# Patient Record
Sex: FEMALE | Race: WHITE | URBAN - METROPOLITAN AREA
[De-identification: names, ages, dates, MRNs, and addresses within clinical notes are randomized per-mention and may not be internally consistent; named-entity substitution may affect disease eponyms.]

---

## 2017-11-13 ENCOUNTER — APPOINTMENT (RX ONLY)
Dept: URBAN - METROPOLITAN AREA CLINIC 142 | Facility: CLINIC | Age: 77
Setting detail: DERMATOLOGY
End: 2017-11-13

## 2017-11-13 DIAGNOSIS — I83.9 ASYMPTOMATIC VARICOSE VEINS OF LOWER EXTREMITIES: ICD-10-CM

## 2017-11-13 DIAGNOSIS — L91.8 OTHER HYPERTROPHIC DISORDERS OF THE SKIN: ICD-10-CM

## 2017-11-13 DIAGNOSIS — Z71.89 OTHER SPECIFIED COUNSELING: ICD-10-CM

## 2017-11-13 DIAGNOSIS — D18.0 HEMANGIOMA: ICD-10-CM

## 2017-11-13 DIAGNOSIS — L82.1 OTHER SEBORRHEIC KERATOSIS: ICD-10-CM

## 2017-11-13 DIAGNOSIS — D22 MELANOCYTIC NEVI: ICD-10-CM

## 2017-11-13 PROBLEM — D22.5 MELANOCYTIC NEVI OF TRUNK: Status: ACTIVE | Noted: 2017-11-13

## 2017-11-13 PROBLEM — D18.01 HEMANGIOMA OF SKIN AND SUBCUTANEOUS TISSUE: Status: ACTIVE | Noted: 2017-11-13

## 2017-11-13 PROBLEM — I10 ESSENTIAL (PRIMARY) HYPERTENSION: Status: ACTIVE | Noted: 2017-11-13

## 2017-11-13 PROBLEM — I83.93 ASYMPTOMATIC VARICOSE VEINS OF BILATERAL LOWER EXTREMITIES: Status: ACTIVE | Noted: 2017-11-13

## 2017-11-13 PROBLEM — L57.0 ACTINIC KERATOSIS: Status: ACTIVE | Noted: 2017-11-13

## 2017-11-13 PROCEDURE — ? COUNSELING

## 2017-11-13 PROCEDURE — ? SKIN TAG REMOVAL MULTI (COSMETIC)

## 2017-11-13 PROCEDURE — 99203 OFFICE O/P NEW LOW 30 MIN: CPT

## 2017-11-13 ASSESSMENT — LOCATION ZONE DERM
LOCATION ZONE: TRUNK
LOCATION ZONE: EAR
LOCATION ZONE: LEG
LOCATION ZONE: NECK
LOCATION ZONE: EYELID

## 2017-11-13 ASSESSMENT — LOCATION SIMPLE DESCRIPTION DERM
LOCATION SIMPLE: RIGHT EAR
LOCATION SIMPLE: LEFT SUPERIOR EYELID
LOCATION SIMPLE: LEFT PRETIBIAL REGION
LOCATION SIMPLE: RIGHT PRETIBIAL REGION
LOCATION SIMPLE: LEFT LOWER BACK
LOCATION SIMPLE: LEFT ANTERIOR NECK
LOCATION SIMPLE: ABDOMEN

## 2017-11-13 ASSESSMENT — LOCATION DETAILED DESCRIPTION DERM
LOCATION DETAILED: LEFT LATERAL SUPERIOR EYELID
LOCATION DETAILED: RIGHT POSTERIOR EAR
LOCATION DETAILED: EPIGASTRIC SKIN
LOCATION DETAILED: RIGHT PROXIMAL PRETIBIAL REGION
LOCATION DETAILED: LEFT PROXIMAL PRETIBIAL REGION
LOCATION DETAILED: LEFT INFERIOR ANTERIOR NECK
LOCATION DETAILED: LEFT INFERIOR LATERAL LOWER BACK

## 2017-11-13 NOTE — PROCEDURE: SKIN TAG REMOVAL MULTI (COSMETIC)
Total Number Of Lesions Treated: 10
Detail Level: Zone
Consent: Written consent obtained and the risks of skin tag removal was reviewed with the patient including but not limited to bleeding, pigmentary change, infection, pain, and remote possibility of scarring.
Price (Use Numbers Only, No Special Characters Or $): 150.00
Anesthesia Volume In Cc: 3
Removed With: liquid nitrogen

## 2018-05-25 ENCOUNTER — HOSPITAL ENCOUNTER (INPATIENT)
Dept: HOSPITAL 96 - M.REH | Age: 78
LOS: 13 days | Discharge: HOME HEALTH | DRG: 74 | End: 2018-06-07
Attending: PHYSICAL MEDICINE & REHABILITATION | Admitting: PHYSICAL MEDICINE & REHABILITATION
Payer: MEDICARE

## 2018-05-25 VITALS — WEIGHT: 143 LBS | BODY MASS INDEX: 25.98 KG/M2 | HEIGHT: 62.01 IN

## 2018-05-25 VITALS — DIASTOLIC BLOOD PRESSURE: 47 MMHG | SYSTOLIC BLOOD PRESSURE: 118 MMHG

## 2018-05-25 DIAGNOSIS — G89.18: ICD-10-CM

## 2018-05-25 DIAGNOSIS — K21.9: ICD-10-CM

## 2018-05-25 DIAGNOSIS — Z98.49: ICD-10-CM

## 2018-05-25 DIAGNOSIS — Z60.2: ICD-10-CM

## 2018-05-25 DIAGNOSIS — I10: ICD-10-CM

## 2018-05-25 DIAGNOSIS — Z88.0: ICD-10-CM

## 2018-05-25 DIAGNOSIS — M20.42: ICD-10-CM

## 2018-05-25 DIAGNOSIS — M20.40: ICD-10-CM

## 2018-05-25 DIAGNOSIS — G62.89: Primary | ICD-10-CM

## 2018-05-25 DIAGNOSIS — M48.061: ICD-10-CM

## 2018-05-25 DIAGNOSIS — Z88.8: ICD-10-CM

## 2018-05-25 DIAGNOSIS — Z98.41: ICD-10-CM

## 2018-05-25 DIAGNOSIS — Z98.42: ICD-10-CM

## 2018-05-25 DIAGNOSIS — M21.962: ICD-10-CM

## 2018-05-25 DIAGNOSIS — M20.41: ICD-10-CM

## 2018-05-25 DIAGNOSIS — M85.80: ICD-10-CM

## 2018-05-25 DIAGNOSIS — M19.90: ICD-10-CM

## 2018-05-25 DIAGNOSIS — D64.9: ICD-10-CM

## 2018-05-25 DIAGNOSIS — Z79.899: ICD-10-CM

## 2018-05-25 SDOH — SOCIAL STABILITY - SOCIAL INSECURITY: PROBLEMS RELATED TO LIVING ALONE: Z60.2

## 2018-05-26 VITALS — SYSTOLIC BLOOD PRESSURE: 111 MMHG | DIASTOLIC BLOOD PRESSURE: 48 MMHG

## 2018-05-26 VITALS — DIASTOLIC BLOOD PRESSURE: 47 MMHG | SYSTOLIC BLOOD PRESSURE: 113 MMHG

## 2018-05-26 VITALS — SYSTOLIC BLOOD PRESSURE: 112 MMHG | DIASTOLIC BLOOD PRESSURE: 35 MMHG

## 2018-05-26 LAB
ANION GAP SERPL CALC-SCNC: 7 MMOL/L (ref 7–16)
BUN SERPL-MCNC: 16 MG/DL (ref 7–18)
CALCIUM SERPL-MCNC: 8.5 MG/DL (ref 8.5–10.1)
CHLORIDE SERPL-SCNC: 104 MMOL/L (ref 98–107)
CO2 SERPL-SCNC: 28 MMOL/L (ref 21–32)
CREAT SERPL-MCNC: 1 MG/DL (ref 0.6–1.3)
GLUCOSE SERPL-MCNC: 109 MG/DL (ref 70–99)
HCT VFR BLD CALC: 26.5 % (ref 37–47)
HGB BLD-MCNC: 8.9 GM/DL (ref 12–15)
MCH RBC QN AUTO: 31.6 PG (ref 26–34)
MCHC RBC AUTO-ENTMCNC: 33.8 G/DL (ref 28–37)
MCV RBC: 93.6 FL (ref 80–100)
MPV: 8.3 FL. (ref 7.2–11.1)
PLATELET COUNT*: 223 THOU/UL (ref 150–400)
POTASSIUM SERPL-SCNC: 4 MMOL/L (ref 3.5–5.1)
RBC # BLD AUTO: 2.83 MIL/UL (ref 4.2–5)
RDW-CV: 15.1 % (ref 10.5–14.5)
SODIUM SERPL-SCNC: 139 MMOL/L (ref 136–145)
WBC # BLD AUTO: 6.7 THOU/UL (ref 4–11)

## 2018-05-26 NOTE — NUR
ASSUMED CARE AT 1930. PATIENT RESTING IN BED WITH LLE ELEVATED ON TWO PILLOWS.
ABLE TO LIFT LEG OFF BED AND ABLE TO GET BOTH LEGS INTO BED HERSELF. UP WITH
MIN ASSIST TO RISE, NEEDS CUEING, GAIT BELT, WALKER. TO BR PER W/C THEN VOIDED
PER TOILET. ABLE TO DO OWN HYGIENE AND APPLIED PERIPAD WITH SETUP. C/O PAIN,
MEDICATED AT HS. SEE MAR. TAKES PILLS WHOLE WITH WATER. TOOK MIRILAX THIS PM
IN [place]. HAD SLIGHT TEMP ELEVATION 100.2 MAX. GIVEN APAP AND
I.S ENCOURAGED. EXTRA COVERS AND ROBE REMOVED (LEAVING TWO SHEETS AND BATH
BLANKET AS COVERS). AT 2205 TEMP WAS 98.6. WHEN GETTING BACK INTO BED FROM
TOILETING, PATIENT SWEPT AT CHUX ON BED STATING, "THERE IS A BUG THERE" NO BUG
THERE, BUT THERE WAS A VERY SMALL BIT OF FUZZ THAT WOULD MATCH THE COLOR OF
HER ROBE. DENIED OTHER VISUAL ISSUES. HOURLY ROUNDS CONTINUE. BED ALARM ON.
CALL LITE IN REACH.

## 2018-05-26 NOTE — NUR
SLEPT MUCH OF THE SHIFT. TURNS SELF, SLEPT ON SIDE WITH PILLOW PROPPING. LLE
ELEVATED ON TWO PILLOWS. POST SPLINT C/D/I. VOIDED ONCE PER BSC. NO FURTHER
C/O PAIN. HOURLY ROUNDS CONTINUE. BED ALARM ON. CALL LITE IN REACH.

## 2018-05-26 NOTE — NUR
ASSUMED CARE AT 0730.  ALERT ORIENTED PLEASANT COOPERATIVE.  HX OF L FOREFOOT
REPAIR, HAS SPLINT AND ACEWRAP TO L LEG.  NWBLLE. TRANSFERS WITH 1 ASSIST G
BELT WALKER FROM W/C TO [facility] TO VOID, ABLE TO DO HYGEINE BUT NEEDS SOME ASSIST
WITH CLOTHING ADJUSTMENTS L HAND HAS SOME NEUROPATHY PRESENT.  MEDICATED X 2
WITH 5 MG. OXY IR BEFORE P.T. SESSIONS.  ICE PACK APPLIED TO L UPPER LEG PRN.
PROPELLS SELF IN W/C TO DR FOR MEALS.  PARTICIPATING IN THERAPIES TODAY.
FEEDS SELF APPETITE FAIR BREAKFAST GOOD AT LUNCH.  RESTING IN BED AFTER
THERAPIES COMPLETED.

## 2018-05-26 NOTE — NUR
ASSUMED CARE AT ADMISSION TO ROOM 328 AT 1930 PER W/C. ASSIST FROM ER STAFF
GETTING PATIENT FROM PRIVATE CAR TO W/C THEN THEY TRANSPORTED HER TO OUR
FLOOR. UP WITH MOD ASSIST, GAIT BELT, WALKER, AND NWB TO LLE. VOIDED PER BSC.
PATIENT VERY NERVOUS ABOUT TRANSFERRING. SUPPORT GIVEN. ABLE TO MOVE RIGHT
FOOT TO PIVOT FROM BED TO BSC, BUT HAS TROUBLE MOVING BACKWARDS TO BED. ABLE
TO LIFT LLE VERY EASILY.  LLE HAS POSTERIOR SPLINT, DRESSING AND ACE WRAP;
THIS NOT TO GET WET NOR DRESSING CHANGED. REPORTS THAT IN THE LAST MONTH SHE
DID FALL AND GOT ASSIST TO GET UP.  USED WALKER AT HOME. ALSO HAS KNEE SCOOTER
THAT SHE HAS NOT HAD MUCH PRACTICE WITH; THIS PUT ASIDE UNTIL EVALUATED FROM
THERAPIES. 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
SISTER LIVES WITH PATIENT IN HER BASEMENT, AND HER SISTER HAS MEDICAL PROBLEMS
OF HER OWN.  PATIENT STATES SHE HAS HAD A ROTATOR CUFF TEAR ON HER RIGHT
SHOULDER, BUT HAS NOT HAD IT REPAIRED. SHE STATES SHE HAS BEEN IN P.T. TRYING
TO IMPROVE HER ROM.  CURRENTLY SHE CAN ONLY RAISE HER ARM ABOUT SHOULDER
HEIGHT. SHE HAS NEUROPATHY IN HER RIGHT HAND, AND IT APPEARS TO HAVE SLIGHT
CONTRACTURE. ALSO STATES THAT HER MOTHER HAD TB WHEN SHE WAS A CHILD (STATES
HER MOM WAS BORN IN 1905) BUT PATIENT WAS TESTED AND WAS NEGATIVE. PATIENT WAS
SENT TO LIVE WITH HER GRANDPARENTS WHILE HER MOTHER GOT TREATMENT FOR HER TB.
STATES THAT SHE SIGNED [facility] BUT
SHE WAS SO SLEEPY THAT SHE WANTS TO SIGN ANOTHER SET NOW THAT SHE IS MORE
ALERT. ASSESSMENT COMPLETED. PAPERS SIGNED. EDUCATED ABOUT REHAB ROUTINE.
HOURLY ROUNDS CONTINUE. BED ALARM ON. CALL LITE IN REACH.

## 2018-05-27 VITALS — DIASTOLIC BLOOD PRESSURE: 47 MMHG | SYSTOLIC BLOOD PRESSURE: 136 MMHG

## 2018-05-27 VITALS — DIASTOLIC BLOOD PRESSURE: 41 MMHG | SYSTOLIC BLOOD PRESSURE: 119 MMHG

## 2018-05-27 NOTE — NUR
PT. TOOK MIRALAX IN [place]UICE THIS AFTERNOON HASNT HAD BM SINCE 5/23/18.
HOURLY ROUNDING COMPLETED.

## 2018-05-27 NOTE — NUR
SLEPT MOST OF THE NIGHT. UP TO VOID ONLY ONCE AT HS. TURNS SELF. PREFERS TO
LIE ON BACK WITH LLE ELEVATED ON TWO PILLOWS. NO C/O PAIN. HOURLY ROUNDS
CONTINUE. BED ALARM ON. CALL LITE IN REACH.

## 2018-05-27 NOTE — NUR
ASSUMED CARE AT 0730.  ALERT ORIENTED PLEASANT COOPERATIVE.  HX OF L FOREFOOT
REPAIR.  HAS SPLINT AND ACE WRAP TO L LOWER LEG C/D/I.  C & S ADEQUATE TO L
FOOT.  TRANSFERS WITH 1 ASSIST G BELT WALKER, NWTHEE.  USING TOILET PER W/C
GRAB BAR STAND PIVOT TO TOILET.  NEEDS MIN ASSIST TO DO CLOTHING ADJUSTMENTS.
ABLE TO DO HYGEINE HAS NEUROPATHY RT. HAND.  MEDICATED X 1 FOR L FOOT PAIN
BEFORE P.T. SESSION THIS A.M.  FEEDS SELF WITH SET UP TAKES MEDS WITHOUT
DIFFICULTY.  USES CALL LIGHT APPROPRIATELY FOR ASSISTANCE.  UP IN RECLINER
WITH FEET ELEVATED.  PROPELLS SELF TO DR PER W/C FOR MEALS.

## 2018-05-27 NOTE — NUR
Nutrition: Pt admitted to rehab s/p forefoot surgery R/T hammer toe. H/o renal
insuff, OA, GERD, HTN. Pt is eating 100% of Regular diet. Wt: 144#. Low
nutrition risk. Will follow weekly.

## 2018-05-28 VITALS — SYSTOLIC BLOOD PRESSURE: 123 MMHG | DIASTOLIC BLOOD PRESSURE: 39 MMHG

## 2018-05-28 VITALS — DIASTOLIC BLOOD PRESSURE: 53 MMHG | SYSTOLIC BLOOD PRESSURE: 155 MMHG

## 2018-05-28 VITALS — DIASTOLIC BLOOD PRESSURE: 51 MMHG | SYSTOLIC BLOOD PRESSURE: 137 MMHG

## 2018-05-28 NOTE — NUR
ASSUMED CARE AT 0730.  ALERT ORIENTED PLEASANT COOPERATIVE.  HX OF L FOREFOOT
REPAIR, ACE WRAP AND SPLINT INTACT FROM L KNEE TO TOES C AND S ADEQUATE.
TRANSFERS WITH 1 ASSIST G BELT WALKER NWBLLE.  VOIDS IN TOILET AND CAN DO
HYGEINE AND CLOTHING ADJUSTMENTS GIVEN TIME.  USES CALL LIGHT APPROPRIATELY
FOR ASSIST.  PARTICIPATING IN THERAPIES AND PROPELLS SELF IN W/C TO DR FOR
MEALS.  LLE ELEVATED IN W/C MEDICATED X 1 FOR PAIN BEFORE P.T. SESSION THIS
A.M.  SISTER HERE LATE AFTERNOON VISITING PT.  JOANIE GUEVARA N.P. ROUNDED.
APPETITE GOOD FEEDS SELF AND NEEDS OCCASSIONAL ASSIST TO CUT MEAT AS SHE HAS
NEUROPATHY RT. HAND.

## 2018-05-28 NOTE — NUR
SLEPT MOST OF THE NIGHT. HAD ONE BM PER TOILET. VOIDS PER TOILET. TRANSFERS
FROM BED TO W/C THEN TO TOILET. GETTING STRONGER, BUT HAS TROUBLE WITH BALANCE
WITH NWB OF LLE AND ADJUSTING CLOTHING. ABLE TO DO HYGIENE. MEDICATED FOR PAIN
WITH RETURN TO SLEEP. HOURLY ROUNDS CONTINUE. BED ALARM ON. CALL LITE IN
REACH.

## 2018-05-29 VITALS — SYSTOLIC BLOOD PRESSURE: 128 MMHG | DIASTOLIC BLOOD PRESSURE: 48 MMHG

## 2018-05-29 VITALS — SYSTOLIC BLOOD PRESSURE: 140 MMHG | DIASTOLIC BLOOD PRESSURE: 56 MMHG

## 2018-05-29 NOTE — NUR
ASSUMED CARE @ 1920-5/28-MONDAY.AWAKE IN BED W/POSTERIOR SPLINT IN PLACE LEFT
LE WRAPPED W/ ACE BANDAGE.HOB UP.LEFT LE ALREADY UP ON 2 PILLOWS @ 1920.BED
ALARM ALREADY ON @ 1920.BRP PER W/C W/ 1 PERSON.NWB LEFT LE OBSERVED ON ALL
TRANSFERS.TEMP-99.0 @ 2010.99.4 ORAL @ 2126.RECHECKED @ 0115-98.4 ORAL.SEE
PAIN MANAGEMENT @ 2151.TRANSFERS W/ MIN ASSIST W/ 1 PERSON.BRP PER W/C @
NIGHT.IND W/ TOILET HYGIENE BUT NEEDS ASSIST W/ CLOTHING ADJUSTMENTS.ON
HOURLY ROUNDS.

## 2018-05-29 NOTE — NUR
SLEEPING SINCE 2300 BUT AWAKE 3X-2X FOR BRP PER W/C & ONCE @ 0400-TO REQUEST
FOR PAIN MED.TOOK ONLY 90% COKE AS HS SNACKS.BRP X5-PER W/C.

## 2018-05-29 NOTE — NUR
SW met with pt to complete initial assessment, introduce self, and SW role.
Plan for pt to dc home with sister; pt sister was bedside.  Pt has cane,
rolling walker, 4 ww with a seat, transport wc. Pt does not have any history
of HH or SNF. SW oriented pt to rehab unit and discussed team conferences on
Wednesdays.  SW to continue to follow to assist with safe dc planning.

## 2018-05-30 VITALS — SYSTOLIC BLOOD PRESSURE: 129 MMHG | DIASTOLIC BLOOD PRESSURE: 46 MMHG

## 2018-05-30 VITALS — DIASTOLIC BLOOD PRESSURE: 47 MMHG | SYSTOLIC BLOOD PRESSURE: 127 MMHG

## 2018-05-30 NOTE — NUR
SW met with pt to review team conference summary. Plan for pt to remain on
rehab unit and continue therapies with team to reteam on Wednesday 6/6 with pt
to dc home with sister on Thursday 6/7 and with  services to follow.  SW
discussed team's recommendation for pt to be wc level at home; SW to arrange
for order of wc.  Pt has a BSC that she borrowed from a friend but she says
she hopes she can get into her bathroom.  Pt to have follow up ortho appt on
Monday 6/4 at 2:00pm at [facility]; SW to arrange
transportation. Pt [facility] could utilize services of [facility].  SW to follow
up to assist with safe dc planning.

## 2018-05-30 NOTE — NUR
ASSUMED CARE AT 0730 PATIENT ALERT/ORIENTED, PAIN MEDS GIVEN X2 THIS SHIFT
WITH GOOD RESULTS, TIME CHANGES TO PAIN MEDS NOTED/ORDERED BY MD. UP WITH
ASSIST OF ONE, NWB TO LEFT LEG, CAST INTACT, TOES PINK/WARM. PARTICIPATED IN
ALL THERAPIES TODAY, TO DINING ROOM FOR MEALS, BED/CHAIR ALARMS IN PLACE, CALL
LIGHT IN REACH. HOURLY ROUNDING COMPLETED.

## 2018-05-30 NOTE — NUR
ASSUMED PT CARE AT 1930.  PT ALERT AND ORIENTED X4, POLITE AND COOPERATIVE
WITH CARES.  PT LLE WITH POSTERIOR SPLINT AND WRAPPED WITH ACE BANDAGE.
GAUZE DRESSING C/D/I.  PT NWB TO LLE, OBSERVED ON ALL TRANSFERS, UP WITH MIN
ASSIST OF ONE PERSON TO WHEELCHAIR.  PT HAS BRP, UP X4 OVERNIGHT TO VOID.
STOOL X2 THIS SHIFT. PT DOES OWN PERICARE BUT NEEDS ASSIST WITH CLOTHING
ADJUSTMENTS.  PRN PAIN MEDICATION TWICE THIS SHIFT FOR LEFT FOOT PAIN.  CALL
LIGHT AND FREQUENTLY USED ITEMS WITHIN REACH.  USES CALL LIGHT APPROPRIATELY.
HOURLY ROUNDING IN PROGRESS, WILL CONTINUE TO MONITOR.

## 2018-05-31 VITALS — SYSTOLIC BLOOD PRESSURE: 169 MMHG | DIASTOLIC BLOOD PRESSURE: 47 MMHG

## 2018-05-31 VITALS — SYSTOLIC BLOOD PRESSURE: 171 MMHG | DIASTOLIC BLOOD PRESSURE: 49 MMHG

## 2018-05-31 VITALS — DIASTOLIC BLOOD PRESSURE: 39 MMHG | SYSTOLIC BLOOD PRESSURE: 114 MMHG

## 2018-05-31 NOTE — NUR
PT SLEPT FAIRLY WELL OVERNIGHT, UP TO BR TO VOID 5X THIS SHIFT. UP WITH GB TO
WHEEL CHAIR WITH MIN SBA AND INTO BR TO VOID. HARD SPLINT CDI TO LBLE, NWB
STATUS MAINTAINED TO LLE. OXY IR GIVEN X2 FOR CO LLE PAIN THIS SHIFT WITH GOOD
RELIEF. SMALL BM OVERNIGHT, HEMORRHOIDS WITH SMALL AMOUNT OF BLEEDING. LLE
ELEVATED ON PILLOW WHILE PT IN BED. ABLE TO USE CALL LITE AND MAKE NEEDS
KNOWN, BED ALARM ON FOR SAFETY OVERNIGHT.

## 2018-05-31 NOTE — NUR
ASSUMED CARE AT 0730 PATIENT ALERT/ORIENTED, PAIN MEDS GIVEN FOR LEFT LEG PAIN
WITH FAIR RELIEF, UP WITH ASSIST OF ONE AND WALKER, NWB TO LEFT LEG.
PARTICIPATED IN ALL THERAPIES TODAY, TO DINING ROOM FOR MEALS, BED/CHAIR
ALARMS IN PLACE, CALL LIGHT IN REACH, HOURLY ROUNDING COMPLETED.

## 2018-06-01 VITALS — DIASTOLIC BLOOD PRESSURE: 45 MMHG | SYSTOLIC BLOOD PRESSURE: 138 MMHG

## 2018-06-01 VITALS — SYSTOLIC BLOOD PRESSURE: 128 MMHG | DIASTOLIC BLOOD PRESSURE: 45 MMHG

## 2018-06-01 NOTE — NUR
SLEPT MOST OF THE NIGHT WITH LEFT LEG UP ON THREE PILLOWS. POST SPLINT REMAINS
C/D/I. TOES PINK. MEDICATED FOR PAIN TWICE WITH RELIEF. UP TO VOID TWICE, TO
BATHROOM VIA W/C THEN STAND PIVOTS TO TRANSFER. STAND PIVOTS BACK TO BED.
CHANGES OWN POSITIONS. HOURLY ROUNDS CONTINUE. BED ALARM ON. CALL LITE IN
REACH.

## 2018-06-01 NOTE — NUR
ASSUMED CARE AT 0730.  ALERT ORIENTED PLEASANT COOPERATIVE HX OF L FOREFOOT
REPAIR, HAS POSTERIOR SPLINT AND ACE WRAP TP L LEG.  ELEVATES WITH LEG REST ON
W/C.  TRANSFERS WITH MIN ASSIST G BELT FROM BED TO W/C FOR BREAKFAST. FEEDS
SELF TAKES MEDS WITHOUT DIFFICULTY.  MEDICATED X 1 FOR L LEG PAIN BEFORE
THERAPIES STARTED.  USES CALL LIGHT APPROPRIATELY FOR ASSISTANCE.

## 2018-06-01 NOTE — NUR
REBECA called [employer] and arranged transportation for pt appt on
Monday June, 4th.  Pt to be picked up at 12:45pm and arrive at 1:30 pm for
2:00pm appt at [facility] and then for transport to
return pt to hospital.  Pt and pt sister aware of plan.

## 2018-06-01 NOTE — NUR
ASSUMED CARE AT 1930. PATIENT RESTING IN BED WITH LT LEG UP ON TWO PILLOWS. UP
WITH ONE, GAIT BELT, WALKER, STAND PIVOT. NWB LLE. TO TOILET VIA W/C. MUCH
IMPROVED SINCE THE WEEKEND. ABLE TO ADJUST CLOTHING PER SELF WHILE HOLDING
ONTO GRAB BARS. MUCH MORE CONFIDENCE. MEDICATED FOR PAIN AT HS. SEE MAR.
HOURLY ROUNDS CONTINUE. BED ALARM ON. CALL LITE IN REACH.

## 2018-06-02 VITALS — SYSTOLIC BLOOD PRESSURE: 153 MMHG | DIASTOLIC BLOOD PRESSURE: 74 MMHG

## 2018-06-02 VITALS — SYSTOLIC BLOOD PRESSURE: 121 MMHG | DIASTOLIC BLOOD PRESSURE: 58 MMHG

## 2018-06-02 NOTE — NUR
ASSUMED CARE @ 1935-6/1-FRI.SITS IN W/C DOING CROSS STITCH W/ LEFT LE UP.
POSTERIOR SPLINT IN PLACE LEFT LE.NWB LEFT LE OBSERVED.ASSISTED TO BED BY CNA
@ 2130.HOB UP.LEFT LE UP ON 2 PILLOWS.BED ALARM PUT ON @ 2130.SEE PAIN MANAGE-
MENT @ 8865.ON HOURLY ROUNDS.CNA DOING ODD HOUR ROUNDS.

## 2018-06-03 VITALS — DIASTOLIC BLOOD PRESSURE: 48 MMHG | SYSTOLIC BLOOD PRESSURE: 172 MMHG

## 2018-06-03 VITALS — SYSTOLIC BLOOD PRESSURE: 147 MMHG | DIASTOLIC BLOOD PRESSURE: 53 MMHG

## 2018-06-03 NOTE — NUR
ASSUMED CARE AT 0730 PATIENT ALERT/ORIENTED UP WITH ASSIST OF ONE AND GAIT
BELT, STAND PIVOT TO W/C, NWB TO LLE. PAIN MEDS GIVEN TODAY WITH GOOD RELIEF.
BED/CHAIR ALARMS IN PLACE, CALL LIGHT IN REACH, HOURLY ROUNDING COMPLETED. TO
DINING ROOM FOR MEALS.

## 2018-06-03 NOTE — NUR
SLEEPING SINCE 2200.AWAKE @ INTERVALS FOR BRP PER W/C X5.TOOK ALL LEMON LIME
SODA W/ ICE AS HS SNACK.

## 2018-06-03 NOTE — NUR
ASSUMED CARE @ 1924-6/2-SAT.SITS IN W/C WATCHING TV W/ LEFT LE UP.POSTERIOR
SPLINT IN PLACE LEFT LE.NWB LEFT LE OBSERVED DURING ALL TRANSFERS.MIN ASSIST
FOR ALL TRANSFERS & TOILETING.ASSISTED TO BED @ 2040.HOB UP.LEFT LE ELEVATED
ON 2 PILLOWS.BED ALARM PUT ON @ 2040.SEE PAIN MANAGEMENT @ 2318.ON HOURLY
ROUNDS.CNA DOING ODD HOUR ROUNDS.

## 2018-06-04 VITALS — DIASTOLIC BLOOD PRESSURE: 46 MMHG | SYSTOLIC BLOOD PRESSURE: 168 MMHG

## 2018-06-04 VITALS — DIASTOLIC BLOOD PRESSURE: 39 MMHG | SYSTOLIC BLOOD PRESSURE: 150 MMHG

## 2018-06-04 NOTE — NUR
ASSUMED CARE @ 1918-6/3-SUNDAY.SITS IN W/C @ BEDSIDE W/ LEFT LE UP.SEE PAIN
MANAGEMENT @ 1945.MIN ASSIST FOR ALL TRANSFERS & TOILETING.NWB LEFT LE
OBSERVED.CRYING DUE TO PAIN.CLAIMS TOLD SOMEBODY THAT SHE NEEDED PAIN MED
ON DAYSHIFT.INSTRUCTED TO CALL BACK IF PAIN MED NOT GIVEN YET.EXPLAINED THAT
THE PERSON MIGHT HAVE FORGOTTEN TO TELL RN ABOUT IT.HOB UP & LEFT LE ELEVATED
ON 2 PILLOWS WHILE IN BED.BED ALARM PUT ON @ 2035.ON HOURLY ROUNDS.CNA DOING
ODD HOUR ROUNDS.

## 2018-06-04 NOTE — NUR
SLEEPING SINCE 2200.AWAKE @ INTERVALS FOR BRP PER W/C W/ MIN ASSIST X 5.
REFUSED HS SNACK.TO GO [facility] TO SEE ORTHOPEDIC 
TODAY 6/4-MONDAY.TO LEAVE @ 1330.

## 2018-06-04 NOTE — NUR
ASSUMED CARE AT 0730 PATIENT/ORIENTED, PAIN MEDS GIVEN AS REQUESTED,
PARTICIPATED IN ALL THERAPIES TODAY, TO DINING ROOM FOR MEALS, BED/CHAIR
ALARMS IN PLACE, CALL LIGHT IN REACH, HOURLY ROUNDING COMPLETED. PATIENT LEFT
FOR ORTHO APPOINTMENT AT [facility] AT 1245 RETURNED AT 1545, NEW CAST TO LEFT
LEG IN PLACE, RETURN APPOINTMENT IN 10 DAYS.

## 2018-06-04 NOTE — NUR
SW faxed completed order for wc and progress note to [facility] and
followed up to confirm acceptance of referral and [facility] to deliver wc
to pt prior to pt dc on Thursday.  SW to continue to follow to arrange HH and
assist with any other dc planning needs.

## 2018-06-05 VITALS — DIASTOLIC BLOOD PRESSURE: 72 MMHG | SYSTOLIC BLOOD PRESSURE: 147 MMHG

## 2018-06-05 VITALS — SYSTOLIC BLOOD PRESSURE: 134 MMHG | DIASTOLIC BLOOD PRESSURE: 48 MMHG

## 2018-06-05 NOTE — NUR
SITTING UP IN RECLINER WITH LEGS ELEVATED WATCHING TV.  PAIN MED GIVEN FOR C/O
LEFT FOOT PAIN RATED "7".  TOOK MEDS WHOLE ALL AT ONCE WITH WATER.  CAST TO
LEFT LEG INTACT.  PINS TO LEFT TOES APPEAR INTACT.

## 2018-06-05 NOTE — NUR
ASSUMMED CARE OF PT AT 0730, PT TRANSFERS WITH A STAND PIVOT ASSIST OF 1, GB,
PT COMPLAINS OF PAIN IN LEFT FOOT, MEDICATED PER ORDERS, CAST TO LEFT
FOOT INTACT, TAKING FOOD AND FLUIDS WELL, PARTICIPATED IN ALL THERAPIES,
WHEELS SELF TO DININGROOM FOR LUNCH, HOURLY ROUNDING COMPLETED, ASSESSMENT
COMPLETE, WILL CONTINUE TO MONITOR.

## 2018-06-05 NOTE — NUR
ASSUMED PT CARE AT 1930.  PT ALERT AND ORIENTED X4, POLITE AND COOPERATIVE
WITH CARES.  PT NWB TO LLE, CAST IN PLACE.  UP SBA, GAIT BELT AND WHEELCHAIR.
PT HAS BRP, UP SEVERAL TIMES OVERNIGHT TO VOID.  NO STOOL THIS SHIFT.  PT DOES
OWN PERICARES.  PRN PAIN MEDICATION TWICE THIS SHIFT FOR LEFT FOOT PAIN.  CALL
LIGHT AND FREQUENTLY USED ITEMS WITHIN REACH.  USES CALL LIGHT APPROPRIATELY.
HOURLY ROUNDING IN PROGRESS, WILL CONTINUE TO MONITOR.

## 2018-06-05 NOTE — NUR
SW called and spoke with pt sister Ely to prepare for team conference and
discuss dc planning.  Pt sister also scheduled family training/car transfer
for Wednesday at 10:30 am.  Pt sister did not have any other questions or
concerns. WC to be delivered by dc date of Thursday 6/7.  SW to discuss with
pt and arrange HH.  SW to continue to follow.

## 2018-06-06 VITALS — DIASTOLIC BLOOD PRESSURE: 48 MMHG | SYSTOLIC BLOOD PRESSURE: 134 MMHG

## 2018-06-06 VITALS — DIASTOLIC BLOOD PRESSURE: 41 MMHG | SYSTOLIC BLOOD PRESSURE: 125 MMHG

## 2018-06-06 NOTE — NUR
UP X TWO DURING THE NIGHT TO THE BATHROOM TO VOID.  TRANSFERS FROM BED TO
WHEELCHAIR WITH SBA, GAITBELT, STAND, PIVOT.  PROPELS SELF TO THE BATHROOM AND
TRANSFERS SELF TO TOILET WITH SBA, STAND, PIVOT.  NON WEIGHT BEARING TO LEFT
LOWER EXTREMITY.  GAVE PAIN MED X ONE DURING THE NIGHT.  RATES LEFT FOOT PAIN
AT A "7" BUT ONLY WANTS TO TAKE ONE OXY IR AT A TIME.  RELIEF OBTAINED. HOURLY
ROUNDING IN PROGRESS.

## 2018-06-06 NOTE — NUR
SW reviewed team conference summary with pt. Plan for pt to dc home with
sister tomorrow, Thursday, June 7.  Pt preference for [facility] HH
services to follow.  SW faxed [facility] the face sheet and H & P
and they accepted referral.  Final orders and med list to be faxed to
[facility] at fax [phone].  Pt plans to call the fire department
to get into the home as she has steps to enter and sister unable to bump up
and pt unable to manuever steps on her own due to mobility limitations.  Pt
has wc through [facility]. Pt sister to provide pt ride home.

## 2018-06-06 NOTE — NUR
ASSUMMED CARE OF PT AT 0730, PT ALERT AND ORIENTED, PT STAND PIVOT TRANSFERS
WITH SBA, GB, COMPLAINS OF PAIN IN LEFT LEG, MEDICATED PER ORDER, CAST TO LEFT
LE INTACT, LEG ELEVATED, NWB STATUS MAINTAINED, TAKING FOOD AND FLUIDS WELL,
VOIDS PER TOILET, WHEEL SELF TO DININGROOM, PARTICIPATED IN ALL THERAPIES,
HOURLY ROUNDING COMPLETED, ASSESSMENT COMPLETE, WILL CONTINUE TO MONITOR.

## 2018-06-07 VITALS — SYSTOLIC BLOOD PRESSURE: 137 MMHG | DIASTOLIC BLOOD PRESSURE: 41 MMHG

## 2018-06-07 VITALS — DIASTOLIC BLOOD PRESSURE: 41 MMHG | SYSTOLIC BLOOD PRESSURE: 137 MMHG

## 2018-06-07 NOTE — NUR
SLEEPING SINCE 0000-6/7-THURSDAY.TOOK ONE BANANA AS HS SNACK.BRP PER W/C W/
ASSIST X3.TEMP @ 1950-99 ORAL.TEMP RE-CHECKED @ 0300-97.8 ORAL.FOR DISCHARGE
TODAY-6/7-THURSDAY.

## 2018-06-07 NOTE — NUR
ASSUMMED CARE OF PT AT 0730, PT ALERT AND ORIENTED, PT TRANSFERS WITH SBA GB
WITH STAND PIVOT, PT COMPLAINS OF PAIN IN LEFT LEG, MEDICATED PER ORDERS, CAST
TO LEFT LEG INTACT, TAKING FOOD AND FLUIDS WELL, VOIDS PER TOILET,
PARTICIPATED IN ALL THERAPIES, HOURLY ROUNDING COMPLETED, ASSESSMENT COMPLETE,
ORDERS OBTAINED FOR DISCHARGE, DISCHARGE EDUCATION DONE WITH PT AND HER
SISTER, DISCUSSED FOLLOW UP APPTS, MEDICATIONS, HOME HEALTH, WHEN TO CALL
PHYSICIAN, CARE OF CAST, NWB STATUS, FALL PRECAUTIONS, PT DISCHARGED TO MAIN
ENTRANCE WITH BELONGINGS AND WHEELCHAIR.

## 2018-06-07 NOTE — NUR
ASSUMED CARE @ 1925-6/6-WED.SITS IN RECLINER W/ SHORT LEG CAST LEFT LEG & LEFT
LEG UP.DOING CROSS STITCH.SEE PAIN MANAGEMENT @ 2158.NWB LEFT LE OBSERVED ON
ALL TRANSFERS.HOB UP IN BED.LEFT LE ELEVATED ON 2 PILLOWS WHILE IN BED.BED
ALARM PUT ON @ 2200.ON HOURLY ROUNDS.CNA DOING ODD HOUR ROUNDS.

## 2018-06-20 NOTE — [place] [address]                    HISTORY AND PHYSICAL          
_______________________________________________________________________________
 
Name:       LORI STAFFORD           Room:           [address] [place]  
[facility]#:  [id number]      Account #:      [id number]  
Admission:  05/25/18     Attend Phys:    Brittany Lewis DO   
Discharge:  06/07/18     Date of Birth:  [birth year]  
         Report #: [id number]
                                                                     [id number]  
_______________________________________________________________________________
THIS REPORT FOR:  //name//                      
 
CC: Ben Lewis
 
DATE OF SERVICE:  05/25/2018
 
This is a 77 year old female admitted to inpatient rehabilitation to facilitate
safe discharge home post left forefoot repair and reconstruction on 5/23/18. 
She is nonweightbearing on the left lower extremity.  Previous level of
function was modified independant to independant with ADLs.  Current level of
function in minimum assistance of 1 to moderate assistance of 2 depending on
therapy, activity and time of day.  There have been no changes since the
pre-admission  screening.  Estimated length of stay is 12-14 days with
discharge to the home setting where she has a supportive sister, a ramp,
wheelchair and walker.  Co-morbid conditions neuropathy in the right hand from
previous shoulder dislocation, anemia with Hgb of 9.7
 
PMH/PSH:
R hand neuropathy
HTN GERD
OA Anemia
Pyelonephritis
Lumbar spine stenosis
Renal Insufficiency
tonsillectomy
b/l cataract surgery
removal of ovarian cyst
 
Allergies:
Fosamax and penicillin
 
Medications:
Reviewed, reconciled and available in the MAR
 
Social History:
Occasional alcohol, no tobacco or illicit drug use
 
ROS:
14 point ROS completed and negative except as mentioned in the HPI.
 
Physical Exam:
AO NAD VSS
Head atraumatic
Eyes PERRLA
Skin: warm and dry, incision is freshly dressed
 
 
 
[facility] 
[address]  [place] [address]                    HISTORY AND PHYSICAL          
_______________________________________________________________________________
 
Name:       LORI STAFFORD           Room:           [address] [place]  
[facility].#:  [id number]      Account #:      [id number]  
Admission:  05/25/18     Attend Phys:    Brittany Lewis DO   
Discharge:  06/07/18     Date of Birth:  [birth year]  
         Report #: [id number]
                                                                     [id number]  
_______________________________________________________________________________
MSK: no clubbing or edema
Neuro: 5/5 strength BLUE and BLLE
 
Assessment:
Repair and reconstruction of a L forefoot deformity, NWB LLE
Ongoing post operative pain
Righ hand neuropathy
multiple medical issues
 
Plan:
PT, OT, RN, CM and HMS to evaluate and treat
POC is pending and Team meeting weekly
NWB LLE
Med rec completed
 
 
 
 
 
 
 
 
 
 
 
 
 
 
 
 
 
 
 
 
 
 
 
 
 
 
 
 
 
 
<ELECTRONICALLY SIGNED>
                                        By:  Brittany Lewis DO            
06/20/18     1338
D: 05/26/18 1410_______________________________________
T: 05/26/18 1746Brittany Lewis DO               /nt

## 2018-06-20 NOTE — PLAN
[facility] 
[address] [place]  [address]                    REHAB UNIT PLAN OF CARE       
_______________________________________________________________________________
 
Name:       LORI STAFFORD           Room:           [address] [place]  
[facility]#:  [id number]      Account #:      [id number]  
Admission:  05/25/18     Attend Phys:    Brittany Lewis DO   
Discharge:  06/07/18     Date of Birth:  [birth year]  
         Report #: [id number]
                                                                     [id number]  
_______________________________________________________________________________
THIS REPORT FOR:  //name//                      
 
CC: Ben Lewis
 
DATE OF SERVICE:  05/26/2018
 
 
OVERALL PLAN OF CARE
 
This is a female admitted to inpatient rehabilitation to facilitate safe
discharge home, status post acute hospitalization.
 
MEDICAL PROGNOSIS:  Good.
 
REHABILITATION PROGNOSIS:  Good.
 
Estimated length of time and stay is 12-14 days with discharge disposition to
the home setting.
 
Previous level of function, modified independent to independent with activities
of daily living.  Current level of function, minimum to moderate assistance
depending on therapy, activity and time of day.
 
Physical therapy will see the patient 60-90 minutes per day, 5 days per week
working on upper and lower body strength, balance, coordination, navigation.
 
Occupational therapy will work with the patient 60-90 minutes per day, 5 days
per week working on upper and lower body strength, balance, coordination,
navigation, bathing, dressing and toileting.
 
This is an overall plan of care, may change from time to time.  We will team
weekly and make changes to plan of care as needed.
 
 
 
 
 
 
 
 
 
 
 
<ELECTRONICALLY SIGNED>
                                        By:  Brittany Lewis DO            
06/20/18     1338
D: 05/26/18 1411_______________________________________
T: 05/26/18 2028Brittany Lewis DO               /nt

## 2020-01-01 NOTE — D
[facility] 
[address] [place]  [address]                    DISCHARGE SUMMARY             
_______________________________________________________________________________
 
Name:       LORI STAFFORD           Room:           [address] [place]  
[facility]#:  [id number]      Account #:      [id number]  
Admission:  05/25/18     Attend Phys:    Brittany Lewis DO   
Discharge:  06/07/18     Date of Birth:  [birth year]  
         Report #: [id number]
                                                                     [id number]  
_______________________________________________________________________________
THIS REPORT FOR:  //name//                      
 
CC: Ben Lewis
 
DATE OF SERVICE:  06/07/2018
 
 
DISCHARGE DIAGNOSES:  Post left forefoot surgical repair and reconstruction.
 
DISCHARGE DISPOSITION:  Discharged to home with home health PT, OT and nursing.
 
Prescriptions for oxycodone and gabapentin were given for 1 month's time.
 
The patient did progress well during therapies during her stay and remained
nonweightbearing on the left lower extremity.  She does have ongoing right hand
neuropathy.
 
DISCHARGE PHYSICAL EXAMINATION:
GENERAL:  Alert, oriented, no apparent distress.
VITAL SIGNS:  Reviewed and are stable.
HEENT:  Atraumatic, normocephalic.  Pupils equal, round, reactive.
ABDOMEN:  Soft, nontender, nondistended.
NEUROLOGIC:  Cranial nerves 2-12 are grossly intact.  No focal neuro deficits.
 
 
 
 
 
 
 
 
 
 
 
 
 
 
 
 
 
 
 
 
<ELECTRONICALLY SIGNED>
                                        By:  Brittany Lewis DO            
07/02/18     1216
D: 06/07/18 1433_______________________________________
T: 06/07/18 Shavonne CYR Joshua, DO               /nt
2020 17:02

## 2025-06-11 NOTE — NUR
ASSUMED CARE AT 0730 PATIENT ALERT/ORIENTED, PAIN MEDS GIVEN X2 THIS SHIFT
WITH GOOD RELIEF. CAST TO LEFT LEG, NWB, PARTICIPATED IN ALL THERAPIES TODAY,
TO DINING ROOM FOR MEALS, BED/CHAIR ALARMS IN PLACE, CALL LIGHT IN REACH,
HOURLY ROUNDING COMPLETED. UP WITH STANDBY ASSIST, PROPELS SELF IN W/C TO
DINING ROOM Detail Level: Zone